# Patient Record
Sex: FEMALE | Race: WHITE | Employment: OTHER | URBAN - METROPOLITAN AREA
[De-identification: names, ages, dates, MRNs, and addresses within clinical notes are randomized per-mention and may not be internally consistent; named-entity substitution may affect disease eponyms.]

---

## 2023-11-11 ENCOUNTER — APPOINTMENT (OUTPATIENT)
Dept: GENERAL RADIOLOGY | Age: 53
End: 2023-11-11
Payer: COMMERCIAL

## 2023-11-11 ENCOUNTER — HOSPITAL ENCOUNTER (EMERGENCY)
Age: 53
Discharge: HOME | End: 2023-11-11
Payer: COMMERCIAL

## 2023-11-11 ENCOUNTER — APPOINTMENT (OUTPATIENT)
Dept: CT IMAGING | Age: 53
End: 2023-11-11
Payer: COMMERCIAL

## 2023-11-11 VITALS
TEMPERATURE: 97.7 F | RESPIRATION RATE: 17 BRPM | DIASTOLIC BLOOD PRESSURE: 88 MMHG | OXYGEN SATURATION: 100 % | HEIGHT: 72 IN | BODY MASS INDEX: 39.28 KG/M2 | WEIGHT: 290 LBS | SYSTOLIC BLOOD PRESSURE: 116 MMHG | HEART RATE: 60 BPM

## 2023-11-11 DIAGNOSIS — S40.022A ARM CONTUSION, LEFT, INITIAL ENCOUNTER: ICD-10-CM

## 2023-11-11 DIAGNOSIS — S93.402A SPRAIN OF LEFT ANKLE, UNSPECIFIED LIGAMENT, INITIAL ENCOUNTER: ICD-10-CM

## 2023-11-11 DIAGNOSIS — S96.912A MUSCLE STRAIN OF LEFT FOOT, INITIAL ENCOUNTER: ICD-10-CM

## 2023-11-11 DIAGNOSIS — S00.03XA CONTUSION OF SCALP, INITIAL ENCOUNTER: Primary | ICD-10-CM

## 2023-11-11 DIAGNOSIS — S06.0X0A CONCUSSION WITHOUT LOSS OF CONSCIOUSNESS, INITIAL ENCOUNTER: ICD-10-CM

## 2023-11-11 DIAGNOSIS — M62.838 TRAPEZIUS MUSCLE SPASM: ICD-10-CM

## 2023-11-11 PROCEDURE — 73610 X-RAY EXAM OF ANKLE: CPT

## 2023-11-11 PROCEDURE — 99284 EMERGENCY DEPT VISIT MOD MDM: CPT

## 2023-11-11 PROCEDURE — 73630 X-RAY EXAM OF FOOT: CPT

## 2023-11-11 PROCEDURE — 73090 X-RAY EXAM OF FOREARM: CPT

## 2023-11-11 PROCEDURE — 72125 CT NECK SPINE W/O DYE: CPT

## 2023-11-11 PROCEDURE — 6360000002 HC RX W HCPCS

## 2023-11-11 PROCEDURE — 70450 CT HEAD/BRAIN W/O DYE: CPT

## 2023-11-11 PROCEDURE — 96372 THER/PROPH/DIAG INJ SC/IM: CPT

## 2023-11-11 RX ORDER — METHOCARBAMOL 750 MG/1
750 TABLET, FILM COATED ORAL 4 TIMES DAILY
Qty: 40 TABLET | Refills: 0 | Status: SHIPPED | OUTPATIENT
Start: 2023-11-11 | End: 2023-11-21

## 2023-11-11 RX ORDER — KETOROLAC TROMETHAMINE 30 MG/ML
30 INJECTION, SOLUTION INTRAMUSCULAR; INTRAVENOUS ONCE
Status: COMPLETED | OUTPATIENT
Start: 2023-11-11 | End: 2023-11-11

## 2023-11-11 RX ORDER — IBUPROFEN 800 MG/1
800 TABLET ORAL EVERY 6 HOURS PRN
COMMUNITY

## 2023-11-11 RX ORDER — DEXMETHYLPHENIDATE HYDROCHLORIDE 20 MG/1
20 CAPSULE, EXTENDED RELEASE ORAL DAILY
COMMUNITY

## 2023-11-11 RX ADMIN — KETOROLAC TROMETHAMINE 30 MG: 30 INJECTION, SOLUTION INTRAMUSCULAR at 19:08

## 2023-11-11 ASSESSMENT — PAIN SCALES - GENERAL
PAINLEVEL_OUTOF10: 8
PAINLEVEL_OUTOF10: 7

## 2023-11-11 ASSESSMENT — PAIN - FUNCTIONAL ASSESSMENT: PAIN_FUNCTIONAL_ASSESSMENT: 0-10

## 2023-11-11 NOTE — ED TRIAGE NOTES
Pt ambulatory with c/o mechanical fall at [facility] last night in which she hit her head and left wrist and twisted her left ankle. Pt c/o bilateral hand pain and left ankle pain. Pt denies LOC.

## 2023-11-11 NOTE — DISCHARGE INSTRUCTIONS
You were evaluated in the emergency department today after fall yesterday CT imaging of your head is negative for fracture, hemorrhage or mass        CT of your neck is negative for fracture or dislocation. You do have some muscle spasming in your neck    X-ray of your left ankle and left foot are negative for fracture or dislocation    You have spasming of your low back as well on the left side      I have written you prescription for Robaxin. This is a muscle relaxer. Take caution until you understand of this medication affects you. Do not drink alcohol in this medication. This medication might cause sleepiness, grogginess, fogginess. Follow instructions regarding concussions. Contact your primary care provider on Monday to schedule follow-up within the next week    Return to the emergency department for chest pain, shortness of breath, signs and symptoms of stroke as listed in your discharge paperwork.

## 2023-11-11 NOTE — ED PROVIDER NOTES
Emergency Department Provider Note       PCP: No primary care provider on file. Age: 48 y.o. Sex: female     DISPOSITION Decision To Discharge 11/11/2023 06:40:30 PM       ICD-10-CM    1. Contusion of scalp, initial encounter  S00. 03XA       2. Concussion without loss of consciousness, initial encounter  S06.0X0A       3. Trapezius muscle spasm  M62.838 methocarbamol (ROBAXIN-750) 750 MG tablet      4. Sprain of left ankle, unspecified ligament, initial encounter  S93.402A       5. Arm contusion, left, initial encounter  S40.022A       6. Muscle strain of left foot, initial encounter  O95.361M           Medical Decision Making     Complexity of Problems Addressed:  Complexity of Problem: 1 acute, uncomplicated illness or injury. Data Reviewed and Analyzed:  I independently ordered and reviewed each unique test.  I reviewed external records: provider visit note from PCP. Primary care visit from 10/12/2023      I interpreted the X-rays. X-ray left forearm negative for acute bony abnormality, fracture or dislocation; x-ray of left ankle negative for acute bony abnormality, fracture or dislocation; x-ray of left foot negative for acute bony abnormality, fracture or dislocation. I am in agreement with radiologist interpretation  I interpreted the CT Scan. CT head without contrast negative for hemorrhage, mass, acute intracranial abnormality. No fracture of the calvarium. CT cervical spine negative for acute bony abnormality, fracture or dislocation. Patient does have chronic degenerative changes. I am in agreement with radiologist interpretation    Discussion of management or test interpretation.   Vital signs reviewed, patient stable, NAD, afebrile, nontoxic in appearance     In summary this is a 21-year-old female with history of discectomy x2 to cervical region, degenerative changes in cervical spine who presents emergency department for headache and fogginess, neck pain, left ankle pain and left Images   were reviewed in bone and soft tissue windows. CT dose lowering techniques were   used, to include: automated exposure control, adjustment for patient size, and   or use of iterative reconstruction. FINDINGS:    Vertebral column:    There is straightening of usual cervical lordosis. Craniocervical junction is   normal. No acute fracture. Vertebral body heights are maintained. Normal bone   mineralization. C2-C3: There is mild disc height loss. No significant central canal or foraminal   narrowing. C3-C4 :There is preserved disc height with shallow central protrusion and facet   arthrosis. No significant central canal or foraminal narrowing. C4-C5: There is mild disc height loss with facet arthrosis. No significant   central canal or foraminal narrowing. C5-C6: There is moderate disc height loss. No significant central canal or   foraminal narrowing. C6-C7: There is advanced disc height loss with disc osteophyte complex No   significant central canal or foraminal narrowing. C7-T1: There is moderate disc height loss with facet arthrosis. No significant   central canal or foraminal narrowing. Soft tissues:    Lung apices are clear. Cervical soft tissues are unremarkable. Impression    Head CT:     1. No hemorrhage or acute intracranial abnormality. Cervical spine CT:    1. No acute fracture in the cervical spine. 2.  Straightening of usual cervical lordosis with multilevel spondylosis, as   above. Anna Khoury MD  Neuroradiologist  [facility]   [email or web address]      Thank you for this referral. This exam was interpreted by a fellowship trained   neuroradiologist with a Certificate of Added Qualification (CAQ). If the   patient's healthcare provider has any questions, a [facility]   can be reached directly at [phone] at any time. This report was created with voice recognition software.  A reasonable attempt   was